# Patient Record
Sex: MALE | Race: WHITE
[De-identification: names, ages, dates, MRNs, and addresses within clinical notes are randomized per-mention and may not be internally consistent; named-entity substitution may affect disease eponyms.]

---

## 2021-12-14 ENCOUNTER — HOSPITAL ENCOUNTER (EMERGENCY)
Dept: HOSPITAL 43 - DL.ED | Age: 26
Discharge: HOME | End: 2021-12-14
Payer: COMMERCIAL

## 2021-12-14 DIAGNOSIS — J10.1: Primary | ICD-10-CM

## 2021-12-14 DIAGNOSIS — Z20.822: ICD-10-CM

## 2021-12-14 DIAGNOSIS — Z72.0: ICD-10-CM

## 2021-12-14 LAB — SARS-COV-2 RNA RESP QL NAA+PROBE: NEGATIVE

## 2021-12-14 PROCEDURE — 99283 EMERGENCY DEPT VISIT LOW MDM: CPT

## 2021-12-14 PROCEDURE — 0240U: CPT

## 2021-12-14 PROCEDURE — 71045 X-RAY EXAM CHEST 1 VIEW: CPT

## 2021-12-14 NOTE — EDM.PDOC
ED HPI GENERAL MEDICAL PROBLEM





- General


Chief Complaint: Fever


Stated Complaint: MANY COVID SYMPTOMS


Time Seen by Provider: 12/14/21 13:49


Source of Information: Reports: Patient, RN, RN Notes Reviewed


History Limitations: Reports: No Limitations





- History of Present Illness


INITIAL COMMENTS - FREE TEXT/NARRATIVE: 


Jenaro is a 26 y/o male who presents to the ED via personal vehicle with 

complaints of chest tightness and cough.  The patient reports his symptoms began

three days ago following an episode of possible aspiration.  The patient notes 

he was heavily intoxicated four day ago and woke up next to emesis; he is 

concerned he aspirated vomit.  Additionally, he notes fever, headache, nausea, 

and muscle aches which began this morning.  He denies dizziness, vision changes,

chest pain, palpitations, shortness of breath, abdominal pain, vomiting, 

diarrhea, or constipation.  He notes he has not drank alcohol over the past four

days.  He attests to eating and drinking fluids per his normal routine.  He has 

taken one dose of ibuprofen last night which provided no alleviation in his 

symptoms.  The patient attests to vaping nicotine frequently; he denies 

recreational drug use. 








  ** Generalized


Pain Score (Numeric/FACES): 10





- Related Data


Home Meds: 


                                    Home Meds





Ibuprofen [Advil] 200 mg PO ASDIRECTED 12/14/21 [History]











Past Medical History





- Past Health History


Medical/Surgical History: Denies Medical/Surgical History





Social & Family History





- Tobacco Use


Tobacco Use Status *Q: Current Every Day Tobacco User


Years of Tobacco use: 4


Packs/Tins Daily: 1





- Caffeine Use


Caffeine Use: Reports: Coffee, Energy Drinks, Soda





- Recreational Drug Use


Recreational Drug Use: No





ED ROS GENERAL





- Review of Systems


Review Of Systems: Comprehensive ROS is negative, except as noted in HPI.





ED EXAM, GENERAL





- Physical Exam


Exam: See Below


Exam Limited By: No Limitations


General Appearance: Alert, No Apparent Distress, Other (Ill-appearing young 

male)


Eye Exam: Bilateral Eye: EOMI, Normal Inspection, PERRL (3mm)


Ears: Normal External Exam, Normal Canal, Hearing Grossly Normal, Normal TMs


Ear Exam: Bilateral Ear: Erythema (To canal)


Nose: Normal Inspection


Throat/Mouth: Normal Inspection, Normal Oropharynx, Normal Voice, No Airway 

Compromise


Head: Atraumatic, Normocephalic


Neck: Normal Inspection, Supple, Non-Tender, Full Range of Motion.  No: 

Lymphadenopathy (L), Lymphadenopathy (R)


Respiratory/Chest: No Respiratory Distress, Normal Breath Sounds, No Accessory 

Muscle Use, Chest Non-Tender, Rales (To right mid-lobe)


Cardiovascular: Normal Peripheral Pulses, Regular Rate, Rhythm, No Edema, No 

Gallop, No JVD, No Murmur, No Rub, Tachycardia


Peripheral Pulses: 2+: Radial (L), Radial (R)


GI/Abdominal: Normal Bowel Sounds, Soft, Non-Tender, No Distention, No Abnormal 

Bruit, No Mass, Pelvis Stable


 (Male) Exam: Deferred


Rectal (Males) Exam: Deferred


Back Exam: Normal Inspection, Full Range of Motion


Extremities: Normal Inspection, Normal Range of Motion, Non-Tender, No Pedal 

Edema, Normal Capillary Refill


Neurological: Alert, Oriented, CN II-XII Intact, Normal Cognition, Normal Gait, 

No Motor/Sensory Deficits


Psychiatric: Normal Affect, Normal Mood


Skin Exam: Warm, Dry, Intact, Normal Color, No Rash.  No: Cyanosis, Jaundice, 

Mottled, Pallor





Course





- Vital Signs


Last Recorded V/S: 


                                Last Vital Signs











Temp  101.1 F H  12/14/21 11:58


 


Pulse  111 H  12/14/21 11:58


 


Resp  22 H  12/14/21 11:58


 


BP  113/70   12/14/21 11:58


 


Pulse Ox  95   12/14/21 11:58














- Orders/Labs/Meds


Labs: 


                                Laboratory Tests











  12/14/21 Range/Units





  11:49 


 


Influenza Type A RNA  Positive H  (NEGATIVE)  


 


Influenza Type B RNA  Negative  (NEGATIVE)  


 


SARS-CoV-2 RNA (CRISTIAN)  Negative  (NEGATIVE)  











Meds: 


Medications














Discontinued Medications














Generic Name Dose Route Start Last Admin





  Trade Name Lucy  PRN Reason Stop Dose Admin


 


Benzonatate  100 mg  12/14/21 14:01  12/14/21 14:22





  Benzonatate 100 Mg Cap  PO  12/14/21 14:02  100 mg





  ONETIME ONE   Administration


 


Ibuprofen  200 mg  12/14/21 14:01  12/14/21 14:22





  Ibuprofen 200 Mg Tab  PO  12/14/21 14:02  200 mg





  ONETIME ONE   Administration














- Re-Assessments/Exams


Free Text/Narrative Re-Assessment/Exam: 





12/14/21 


Given worry for aspiration, will obtain CXR. 





Findings of examination, lab work, and imaging reviewed with patient.  

Supportive cares for influenza discussed.  Will treat cough with Tessalon. Red 

flag signs and symptoms which would warrant immediate reevaluation reviewed.  

Patient verbalized understanding and agreement with the plan of care.  











Departure





- Departure


Time of Disposition: 14:28


Disposition: Home, Self-Care 01


Condition: Good


Clinical Impression: 


 Influenza A








- Discharge Information


*PRESCRIPTION DRUG MONITORING PROGRAM REVIEWED*: Not Applicable


*COPY OF PRESCRIPTION DRUG MONITORING REPORT IN PATIENT MARIA ISABEL: Not Applicable


Instructions:  Influenza, Adult


Forms:  ED Department Discharge


Additional Instructions: 


Rx: Tessalon Perles 100mg (#15)





1.) You may take ibuprofen (Advil/Motrin) 400-800mg every six hours, as fever, 

muscle aches, and headache persist.  You may also take acetaminophen (Tylenol) 

650-1000mg every six hours, as symptoms persists.  You may stagger these 

medications so you are taking a dose of either every three hours.


2.) Drink small, frequent sips of water to stay hydrated but avoid nausea. 


3.) Eat a bland diet.  Avoid spicy, greasy, high-fat foods. 


4.) Do not return to normal activities until you are 24 hour fever-free without 

fever reducing medications. 


5.) Self-isolate from individuals who are not sick, especially the pregnant, 

elderly, and young.   





Sepsis Event Note (ED)





- Focused Exam


Vital Signs: 


                                   Vital Signs











  Temp Pulse Resp BP Pulse Ox


 


 12/14/21 11:58  101.1 F H  111 H  22 H  113/70  95

## 2021-12-14 NOTE — CR
EXAMINATION: Chest 1V Frontal  SEX: Male   AGE: 25 years

 

CLINICAL HISTORY: 25-year-old male with influenza (A positive) and "rales" right

upper lobe. No comparisons.

 

Interpretation: Negative exam.

 

1. Normal cardiac silhouette (size and configuration). Slight asymmetric

elevation right hemidiaphragm.

2. No vascular congestion, cephalization of flow, alveolar edema or dependent

pleural effusion.

3. No focal lobar alveolar consolidation, air bronchograms or peripheral

"groundglass" interstitial lung densities (GGO).

4. No lung mass or hilar/mediastinal lymphadenopathy. Normal midline tracheal

bronchial airway.

5. Bony thorax unremarkable.

6. No pneumothorax or pneumomediastinum. No free subdiaphragmatic air.

## 2023-09-22 ENCOUNTER — HOSPITAL ENCOUNTER (EMERGENCY)
Dept: HOSPITAL 43 - DL.ED | Age: 28
Discharge: HOME | End: 2023-09-22
Payer: COMMERCIAL

## 2023-09-22 DIAGNOSIS — J45.909: ICD-10-CM

## 2023-09-22 DIAGNOSIS — Z88.8: ICD-10-CM

## 2023-09-22 DIAGNOSIS — Z79.899: ICD-10-CM

## 2023-09-22 DIAGNOSIS — S51.851A: Primary | ICD-10-CM

## 2023-09-22 DIAGNOSIS — F17.200: ICD-10-CM

## 2023-09-22 DIAGNOSIS — S51.831A: ICD-10-CM
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
